# Patient Record
Sex: MALE | HISPANIC OR LATINO | Employment: PART TIME | URBAN - METROPOLITAN AREA
[De-identification: names, ages, dates, MRNs, and addresses within clinical notes are randomized per-mention and may not be internally consistent; named-entity substitution may affect disease eponyms.]

---

## 2018-02-04 ENCOUNTER — HOSPITAL ENCOUNTER (EMERGENCY)
Facility: HOSPITAL | Age: 68
Discharge: LEFT BEFORE DISCHARGE | End: 2018-02-04
Attending: EMERGENCY MEDICINE
Payer: MEDICARE

## 2018-02-04 VITALS
RESPIRATION RATE: 26 BRPM | HEART RATE: 86 BPM | WEIGHT: 200 LBS | DIASTOLIC BLOOD PRESSURE: 78 MMHG | TEMPERATURE: 99 F | SYSTOLIC BLOOD PRESSURE: 140 MMHG | OXYGEN SATURATION: 98 % | HEIGHT: 70 IN | BODY MASS INDEX: 28.63 KG/M2

## 2018-02-04 DIAGNOSIS — R42 DIZZINESS: ICD-10-CM

## 2018-02-04 PROCEDURE — 99900041 HC LEFT WITHOUT BEING SEEN- EMERGENCY

## 2018-02-04 PROCEDURE — 93010 ELECTROCARDIOGRAM REPORT: CPT | Mod: ,,, | Performed by: INTERNAL MEDICINE

## 2018-02-04 PROCEDURE — 93005 ELECTROCARDIOGRAM TRACING: CPT

## 2018-02-04 RX ORDER — ATORVASTATIN CALCIUM 20 MG/1
20 TABLET, FILM COATED ORAL DAILY
COMMUNITY

## 2018-02-05 NOTE — ED PROVIDER NOTES
"Encounter Date: 2/4/2018    SCRIBE #1 NOTE: I,  Salo, am scribing for, and in the presence of,  Javier Portillo MD. I have scribed the entire note.       History     Chief Complaint   Patient presents with    Dizziness      line used for triage. : kennedy #[id number].  pt presents to ed wt c/o sudden onset dizziness, weakness, and nausea that began at around 2 pm today after grocery shopping. pt reports also feeling a sensation of "swelling to left side of chest."  reports mild improvement in symptoms with lying on right side. pt needing assistance with ambulation to triage due to dizziness. +dry heaves noted.     12:00 AM - Attempted to go see the patient. They had been called by nursing staff several times in the waiting room. The patient left without being seen. Please see nursing notes on timing of calls for the patient.          Review of patient's allergies indicates:  No Known Allergies  Past Medical History:   Diagnosis Date    Anxiety     Depression     Hypertension      History reviewed. No pertinent surgical history.  History reviewed. No pertinent family history.  Social History   Substance Use Topics    Smoking status: Never Smoker    Smokeless tobacco: Never Used    Alcohol use No     Review of Systems    Physical Exam     Initial Vitals [02/04/18 1856]   BP Pulse Resp Temp SpO2   (!) 140/78 86 (!) 26 98.7 °F (37.1 °C) 98 %      MAP       98.67         Physical Exam    ED Course   Procedures  Labs Reviewed - No data to display                            ED Course      Clinical Impression:     1. Dizziness            Disposition:   Disposition: LWBS       Scribe Attestation ***                "

## 2024-03-12 DIAGNOSIS — R42 DIZZINESS: Primary | ICD-10-CM

## 2024-04-18 ENCOUNTER — CLINICAL SUPPORT (OUTPATIENT)
Dept: REHABILITATION | Facility: HOSPITAL | Age: 74
End: 2024-04-18
Payer: MEDICARE

## 2024-04-18 DIAGNOSIS — I95.1 AUTONOMIC POSTURAL HYPOTENSION: Primary | ICD-10-CM

## 2024-04-18 DIAGNOSIS — R42 DIZZINESS: ICD-10-CM

## 2024-04-18 PROCEDURE — 97161 PT EVAL LOW COMPLEX 20 MIN: CPT | Mod: PN

## 2024-04-18 NOTE — PLAN OF CARE
"[facility]  Physical Therapy Neurological Rehabilitation Initial Evaluation     Name: Raymond Scott  Clinic Number: [id number]    Therapy Diagnosis:   Encounter Diagnoses   Name Primary?    Autonomic postural hypotension Yes    Dizziness      Physician: Shania Petersen*    Physician Orders: PT Eval and Treat   Medical Diagnosis from Referral:   Diagnosis   R42 (ICD-10-CM) - Dizziness     Evaluation Date: 4/18/2024  Authorization Period Expiration: 3/12/2024  Plan of Care Expiration: 5/20/2024    Visit # / Visits authorized: 1/ 1  FOTO: 1/ 3    Precautions: Fall    Time In: 0935  Time Out: 10:15  Total Billable Time: 40 minutes    Subjective    Friend present to interpret, patient declined [facility]  services today.     Date of onset: 2 months ago most    History of current condition - Raymond reports: a history of veritgo in the past that involved spinning and vomiting but reports that this time it is different. When he get up or stands up occasionally on some days he gets dizzy and woozy especially at night going to the bathroom.      Prior Therapy: none recent  Social History:  lives with their spouse  Falls: 0   DME: none  Family Present at time of Eval: friend    Occupation: retired  Prior Level of Function: indep on "good days" but does not drive, he [place] and likes to walk to the stores nearby his home.  Current Level of Function: limiting outings due to dizziness at times and fear of being out and unable to walk back home safely    Pain:  Current 0/10  Location: ear and head    Patient's goals: to no be dizzy    Medical History:   Past Medical History:   Diagnosis Date    Anxiety     Depression     Hypertension      Surgical History:   Raymond Scott  has no past surgical history on file.    Medications:   Raymond has a current medication list which includes the following prescription(s): albuterol sulfate, atorvastatin, lorazepam, " "losartan-hydrochlorothiazide 50-12.5 mg, and trazodone.    Allergies:   Review of patient's allergies indicates:  No Known Allergies     History of Current Symptoms   Triggers: getting up for lying and standing   Alleviating Factors: getting his bearings in standing   Description of symptoms: dizziness but no spinning (sensation of spinning vs lightheadedness)   Onset: 2 months ago   Frequency: weekly but not daily   Duration: minutes   Positional changes: yes see triggers    Objective   - Follows commands: 100% of time   - Speech: no deficits, english is his 2nd language      Initial Vitals  BP: 138/91 in supine  123/91 In sitting  109/84 in standing  Pulse: 64-72 bpm at rest    Mental status: alert, oriented to person, place, and time, normal mood, behavior, speech, dress, motor activity, and thought processes  Appearance: Casually dressed  Behavior:  calm and cooperative  Attention Span and Concentration:  Normal    Posture Alignment in sitting:   WNL    Posture Alignment in standing:   mCTIB TBD         Visual/Auditory: denies changes   Tracking/Smooth Pursuits:Intact  Saccades: Intact  VOR: not tested today  Eye surgeries/disorder: patient wears bi-focal eye glasses       MCT-SIB: Pending  (P= Pass, F= Fail; note any sway; hold each position for 30")  Condition 1: (firm surface/feet together/eyes open)   Condition 2: (firm surface/feet together/eyes closed)   Condition 3: (soft surface/feet together/eyes open)   Condition 4: (soft surface/feet together/eyes closed)   Gait Assessment:(if indicated)  - AD used: none  - Assistance: Indep  - Distance: community distances when feeling well    GAIT DEVIATIONS:  Raymond displays the following deviations with ambulation: none significant seen      POSITIONAL CANAL TESTING  Looking for nystagmus (slow drift to affected side with quick correction away)    Promise Hallpike (posterior / CL anterior)   Not tested today because patient took meclizine today prior to session and has " been taking it 3x/day for 2 months.   Horizontal Canals   Not tested today because patient took meclizine today prior to session and has been taking it 3x/day for 2 months.       Functional Mobility (Bed mobility, transfers)  Bed mobility: I  Supine to sit: I  Sit to supine: I  Transfers to bed: I  Transfers to toilet: I  Sit to stand:  I  Stand pivot:  I  Car transfers: I    Intake Outcome Measure for FOTO DHI Survey    Therapist reviewed FOTO scores for Raymond Scott on 4/18/2024.   FOTO report - see Media section or FOTO account episode details.    Intake Score: 28/100       Treatment     Total Treatment time separate from Evaluation: 10 minutes    Raymond received the treatments listed below:      therapeutic activities to improve functional performance for 10  minutes, including:  Education on the following:   (How to manage orthostatic hypotension)   (What is orthostatic hypotension)   (Causes of orthostatic hypotension)    Patient Education and Home Exercises     Education provided:   - see media handout  - need to come to next therapy session without taking anti-vertigo medication because it causes false negatives with BPPV testing  - need to contact MD about BP     HEP to be provided as need for BPPV manuevers if he is positive for BPPV on next session.     Assessment     Raymond is a 73 y.o. male referred to outpatient Physical Therapy with a medical diagnosis of dizziness. Patient presents with positive signs of orthostatic hypotension.  He has been taking anti-vertigo medication 3x/day without improvement in symptoms and he presented today after taking it so he was unable to participate in BPPV testing in the eval.  He would benefit from following the recommendation for orthostatic hypotension issued today and he would benefit from further BPPV testing next session.      Patient prognosis is Good.   Patient will benefit from skilled outpatient Physical Therapy to address the deficits stated above and in the  chart below, provide patient /family education, and to maximize patient's level of independence.     Plan of care discussed with patient: Yes  Patient's spiritual, cultural and educational needs considered and patient is agreeable to the plan of care and goals as stated below:     Anticipated Barriers for therapy: he has a history of taking meclizine for month at a time, h/o anxiety and depression    Medical Necessity is demonstrated by the following  History  Co-morbidities and personal factors that may impact the plan of care [x] LOW: no personal factors / co-morbidities  [] MODERATE: 1-2 personal factors / co-morbidities  [] HIGH: 3+ personal factors / co-morbidities    Moderate / High Support Documentation:   Co-morbidities affecting plan of care: see history above    Personal Factors:   age  Language barrier     Examination  Body Structures and Functions, activity limitations and participation restrictions that may impact the plan of care [x] LOW: addressing 1-2 elements  [] MODERATE: 3+ elements  [] HIGH: 4+ elements (please support below)    Moderate / High Support Documentation: ocular movement and vitals     Clinical Presentation [x] LOW: stable  [] MODERATE: Evolving  [] HIGH: Unstable     Decision Making/ Complexity Score: low       Long term Goals:    1. Pt will be negative for B jett hallpike and roll testing.   2. Pt will have No complaint of positional vertigo for 2 weeks with daily activity for improved function.   3. Pt will have 10 point decrease in limitation on the vertigo FOTO survey for improved function.      Plan     Plan of care Certification: 4/18/2024 to 5/10/2024.    Outpatient Physical Therapy 2 times weekly for 4 weeks to include the following interventions: Gait Training, Manual Therapy, Moist Heat/ Ice, Neuromuscular Re-ed, Patient Education, Self Care, Therapeutic Activities, and Therapeutic Exercise.     Chrei Bullock, PT        Physician's Signature:  _________________________________________ Date: ________________

## 2024-04-19 PROBLEM — I95.1 AUTONOMIC POSTURAL HYPOTENSION: Status: ACTIVE | Noted: 2024-04-19

## 2024-04-19 PROBLEM — R42 DIZZINESS: Status: ACTIVE | Noted: 2024-04-19

## 2024-05-02 ENCOUNTER — CLINICAL SUPPORT (OUTPATIENT)
Dept: REHABILITATION | Facility: HOSPITAL | Age: 74
End: 2024-05-02
Payer: MEDICARE

## 2024-05-02 DIAGNOSIS — I95.1 AUTONOMIC POSTURAL HYPOTENSION: Primary | ICD-10-CM

## 2024-05-02 DIAGNOSIS — R42 DIZZINESS: ICD-10-CM

## 2024-05-02 PROCEDURE — 97112 NEUROMUSCULAR REEDUCATION: CPT | Mod: PN

## 2024-05-02 PROCEDURE — 97530 THERAPEUTIC ACTIVITIES: CPT | Mod: PN

## 2024-05-02 PROCEDURE — 95992 CANALITH REPOSITIONING PROC: CPT | Mod: PN

## 2024-05-02 NOTE — PROGRESS NOTES
"[facility]   Physical Therapy Treatment Note      Name: Raymond Scott  [facility] Number: [id number]    Therapy Diagnosis:   Encounter Diagnoses   Name Primary?    Autonomic postural hypotension Yes    Dizziness      Physician: Shania Petersen*    Visit Date: 5/2/2024  Physician Orders: PT Eval and Treat   Medical Diagnosis from Referral:   Diagnosis   R42 (ICD-10-CM) - Dizziness      Evaluation Date: 4/18/2024  Authorization Period Expiration: 3/12/2024  Plan of Care Expiration: 5/20/2024     Visit # / Visits authorized: 1/TBD  FOTO: 1/ 3     Precautions: Fall     Time In: 3:15PM  Time Out: 4:00PM  Total Billable Time: 45 minutes (1 CRM + 2TA + 1NMR)    PTA Visit #: 0/5     Subjective     Patient reports: He has not taken meclizine in several days. No additional level of dizziness reported.  He was compliant with home exercise program.  Response to previous treatment: No longer taking Meclizine  Functional change: No longer taking Meclizine    Pain: 0/10  Location: N/A    Objective      Objective Measures updated at progress report unless specified.     Visual/Auditory:  Tracking/Smooth Pursuits: Intact and asymptomatic   Saccades: Intact and asymptomatic   Convergence: Intact and asymptomatic   VOR: Mildly impaired with right head impulse but asymptomatic    MCT-SIB:  (P= Pass, F= Fail; note any sway; hold each position for 30")  Condition 1: (firm surface/feet together/eyes open) P no sway  Condition 2: (firm surface/feet together/eyes closed) P min sway  Condition 3: (soft surface/feet together/eyes open) P min sway  Condition 4: (soft surface/feet together/eyes closed) P mod sway    POSITIONAL CANAL TESTING  Promise Hallpike (posterior / CL anterior)   Right: Negative   Left: Negative  Horizontal Canals   Right: Negative   Left: Negative     Treatment     Raymond received the treatments listed below:      Canalith repositioning to assess for presence of BPPV for 5 minutes (untimed) " including:  - Promise-Hallpike (see above)  - Roll Test (see above)    neuromuscular re-education activities to assess: vestibular and oculomotor function for 15 minutes. The following activities were included:  - Vestibular/oculomotor battery (see above)    therapeutic activities to improve functional performance for 25 minutes, including:  - Patient education on likely orthostatic hypotension as main contribution to dizziness considering presentation on evaluation, no worsening symptoms now that he has weaned from Meclizine, and no major concordant symptoms nor abnormalities noted on exam; review of [place] handout  - Recommend follow up with PCP if dizziness persists    Patient Education and Home Exercises       Education provided:   - See TA    Written Home Exercises Provided: Patient instructed to cont prior HEP. Exercises were reviewed and Raymond was able to demonstrate them prior to the end of the session.  Raymond demonstrated good  understanding of the education provided. See Electronic Medical Record under Patient Instructions for exercises provided during therapy sessions    Assessment     Raymond does not appear to have BPPV nor other major vestibulopathy. Today, assessment was continued from evaluation because patient is no longer taking Meclizine which would have skewed initial vestibular battery. He does display mild corrective saccade with right head impulse but is asymptomatic on exam and displays no abnormality with sensory integration assessment. Subjective report of dizziness quality/behavior and orthostatic assessment performed at first visit suggest that orthostatic hypotension is a more likely culprit for dizziness. Patient will not require skilled vestibular physical therapy at this time and is agreeable to discharge today. Recommend that patient follow up with primary care if orthostatic symptoms persist.    Raymond Is progressing well towards his goals.   Patient prognosis is Good.     Patient's spiritual,  cultural and educational needs considered and pt agreeable to plan of care and goals.     Anticipated barriers to physical therapy: h/o anxiety and depression     Goals:     Long Term Goals = Short Term Goals: 4 weeks     1. Pt will be negative for B jett hallpike and roll testing. (met)  2. Pt will have No complaint of positional vertigo for 2 weeks with daily activity for improved function. (met)  3. Pt will have 10 point decrease in limitation on the vertigo FOTO survey for improved function. (Progressing, not met)    Plan     Discharge PT    AIME STANLEY PT